# Patient Record
Sex: MALE | Race: WHITE
[De-identification: names, ages, dates, MRNs, and addresses within clinical notes are randomized per-mention and may not be internally consistent; named-entity substitution may affect disease eponyms.]

---

## 2021-11-09 NOTE — EDM.PDOC
ED HPI GENERAL MEDICAL PROBLEM





- General


Chief Complaint: General


Stated Complaint: PASSED OUT


Time Seen by Provider: 11/09/21 12:26


Source of Information: Reports: Patient, Other ()


History Limitations: Reports: No Limitations





- History of Present Illness


INITIAL COMMENTS - FREE TEXT/NARRATIVE: 





Patient presents after a syncopal episode in lab today.  They had just inserted 

the needle in his arm but hadn't drawn much blood yet.  He "passed out" for 

about 30 seconds and exhibited a few dry heaves.  He was sitting back in his 

chair and didn't fall to floor.  No loss of urine.  Patient doesn't remember any

of this.  He tells me he has had body aches, fatigue, weakness and occasional 

vomiting and diarrhea for the last week.  Also felt feverish but never checked 

temp.  The last two days he has been improving but still loose stools 3x/day and

still feeling weak and tired.  He feels like he is getting over the "flu".  He 

was tested yesterday for Covid, influenza a/b, RSV here at the hospital and all 

were negative.





- Related Data


                                    Allergies











Allergy/AdvReac Type Severity Reaction Status Date / Time


 


pollen extracts Allergy  Other Verified 11/09/21 12:00











Home Meds: 


                                    Home Meds





Albuterol Sulfate [Proair Hfa] 2 inh INH Q4H PRN 11/09/21 [History]


Lisinopril/Hydrochlorothiazide [Lisinopril-Hctz 20-12.5 mg Tab] 1 tab PO DAILY 

11/09/21 [History]


amLODIPine [Norvasc] 2.5 mg PO DAILY 11/09/21 [History]











Past Medical History


Cardiovascular History: Reports: Hypertension


Respiratory History: Reports: Asthma





Social & Family History





- Tobacco Use


Tobacco Use Status *Q: Current Every Day Tobacco User


Years of Tobacco use: 39


Packs/Tins Daily: 1


Second Hand Smoke Exposure: Yes





- Caffeine Use


Caffeine Use: Reports: Soda


Caffeine Use Comment: 6 cans per day





- Alcohol Use


Days Per Week of Alcohol Use: 7


Number of Drinks Per Day: 2


Total Drinks Per Week: 14


Date of Last Drink: 11/09/21


Time of Last Drink: 21:00





- Recreational Drug Use


Recreational Drug Use: No





ED ROS GENERAL





- Review of Systems


Review Of Systems: See Below


Constitutional: Reports: Chills, Malaise, Weakness, Fatigue


HEENT: Denies: Ear Pain, Throat Pain, Vision Change


Respiratory: Denies: Shortness of Breath, Cough (no worse than his normal 

"smokers cough")


Cardiovascular: Reports: Syncope.  Denies: Chest Pain, Lightheadedness


Endocrine: Reports: Fatigue


GI/Abdominal: Reports: Diarrhea, Vomiting.  Denies: Abdominal Pain


: Denies: Dysuria, Flank Pain


Musculoskeletal: Reports: No Symptoms


Skin: Reports: No Symptoms


Neurological: Reports: Syncope.  Denies: Confusion, Dizziness, Headache, 

Seizure, Trouble Speaking, Difficulty Walking


Psychiatric: Denies: Agitation, Anxiety, Confusion





ED EXAM, GENERAL





- Physical Exam


Exam: See Below


Exam Limited By: No Limitations


General Appearance: Alert, WD/WN, No Apparent Distress


Eye Exam: Bilateral Eye: EOMI, Normal Inspection, PERRL


Ears: Normal External Exam, Hearing Grossly Normal


Nose: Normal Inspection, No Blood


Throat/Mouth: Normal Inspection, Normal Lips, Normal Voice, No Airway Compromise


Head: Atraumatic, Normocephalic


Neck: Normal Inspection, Full Range of Motion


Respiratory/Chest: No Respiratory Distress, Lungs Clear, Normal Breath Sounds, 

No Accessory Muscle Use


Cardiovascular: Regular Rate, Rhythm, No Murmur


GI/Abdominal: Normal Bowel Sounds, Soft, Non-Tender, No Organomegaly, No 

Distention


Back Exam: Normal Inspection, Full Range of Motion.  No: CVA Tenderness (L), CVA

 Tenderness (R)


Extremities: Normal Inspection, Normal Range of Motion


Neurological: Alert, Oriented, Normal Cognition, No Motor/Sensory Deficits


Psychiatric: Normal Affect, Normal Mood


Skin Exam: Warm, Dry, Intact, Normal Color, No Rash





Course





- Vital Signs


Last Recorded V/S: 


                                Last Vital Signs











Temp  96.5 F L  11/09/21 11:55


 


Pulse  70   11/09/21 13:15


 


Resp  20   11/09/21 13:15


 


BP  167/104 H  11/09/21 13:15


 


Pulse Ox  97   11/09/21 13:15














- Orders/Labs/Meds


Orders: 


                               Active Orders 24 hr











 Category Date Time Status


 


 Sodium Chloride 0.9% @ 999 MLS/HR (1000ml) Med  11/09/21 12:57 Ordered





 Sodium Chloride 0.9% [Normal Saline] 1,000 ml   





 IV .BOLUS   


 


 EKG 12 Lead [EK] Stat Ther  11/09/21 12:42 Ordered








                                Medication Orders





Sodium Chloride (Normal Saline)  1,000 mls @ 999 mls/hr IV .BOLUS ONE


   Stop: 11/09/21 13:57


   Last Admin: 11/09/21 13:10  Dose: 999 mls/hr


   Documented by: TRISTAN








Meds: 


Medications











Generic Name Dose Route Start Last Admin





  Trade Name Paulino  PRN Reason Stop Dose Admin


 


Sodium Chloride  1,000 mls @ 999 mls/hr  11/09/21 12:57  11/09/21 13:10





  Normal Saline  IV  11/09/21 13:57  999 mls/hr





  .BOLUS ONE   Administration














- Re-Assessments/Exams


Free Text/Narrative Re-Assessment/Exam: 





11/09/21 13:33


EKG shows NSR with HR 65


11/09/21 13:52


CBC and CMP are good.  Since they were ordered, along with lipid panel by Dr. Lechuga, they don't populate into this ER note but I discussed them with antoni malloy.  I informed him that his triglycerides are elevated and he will want to 

discuss those results with Dr. Lechuga soon.  


Patient is feeling better and a liter of NS is almost in.  He is discharged to 

home in stable condition.





Departure





- Departure


Time of Disposition: 13:54


Disposition: Home, Self-Care 01


Condition: Good


Clinical Impression: 


Episode of syncope


Qualifiers:


 Syncope type: vasovagal syncope Qualified Code(s): R55 - Syncope and collapse








- Discharge Information


Instructions:  Syncope, Easy-to-Read


Referrals: 


Dacia Aguilar MD [Primary Care Provider] - 


Forms:  ED Department Discharge


Additional Instructions: 


Drink 8 cups of water daily.





Call your PCP for follow up appointment to discuss your labs from today.





If any problems, recheck in clinic or ER as needed.





Sepsis Event Note (ED)





- Evaluation


Sepsis Screening Result: No Definite Risk





- Focused Exam


Vital Signs: 


                                   Vital Signs











  Temp Pulse Resp BP Pulse Ox


 


 11/09/21 13:15   70  20  167/104 H  97


 


 11/09/21 11:55  96.5 F L  75  20  122/83  98














- My Orders


Last 24 Hours: 


My Active Orders





11/09/21 12:42


EKG 12 Lead [EK] Stat 





11/09/21 12:57


Sodium Chloride 0.9% @ 999 MLS/HR (1000ml) Sodium Chloride 0.9% [Normal Saline] 

1,000 ml IV .BOLUS 














- Assessment/Plan


Last 24 Hours: 


My Active Orders





11/09/21 12:42


EKG 12 Lead [EK] Stat 





11/09/21 12:57


Sodium Chloride 0.9% @ 999 MLS/HR (1000ml) Sodium Chloride 0.9% [Normal Saline] 

1,000 ml IV .BOLUS

## 2021-11-12 NOTE — EDM.PDOC
ED HPI GENERAL MEDICAL PROBLEM





- General


Stated Complaint: DISORIENTATION


Time Seen by Provider: 11/12/21 13:04


Source of Information: Reports: Patient, Provider, Significant Other


History Limitations: Reports: No Limitations





- History of Present Illness


INITIAL COMMENTS - FREE TEXT/NARRATIVE: 





Patient presents with several symptoms; some old, some new.  For the past 2 

weeks he has noticed some trouble with balance and blurry vision.  Three days 

ago he passed out from a routine lab draw which seemed odd to him since he 

frequently has small injuries at work and isn't bothered by seeing his own 

blood.  He was in ER for that.  Last evening he had 2 drinks of alcohol and 

around 9-10:00 PM took his Alprazolam for the first time (a new Rx from PCP, 

Liu Hook).  He awoke at 0400 not feeling quite right, a little confused.  

His wife says at 0730, he took a bath and got ready for work then went back to 

bed and slept a bit.  At 0800 he took another bath and didn't remember he did 

before; wife says he was "disoriented like he was drunk".  At 0930 he went to 

work and they sent him home due to confusion.  At 1000 he couldn't remember how 

to use his debit card/PIN at [employer].  He isn't disoriented except 

thought it was 11/11 instead of 11/12.  He knew yesterday was 's Day, 

knows today is Friday.  





- Related Data


                                    Allergies











Allergy/AdvReac Type Severity Reaction Status Date / Time


 


pollen extracts Allergy  Other Verified 11/12/21 12:14











Home Meds: 


                                    Home Meds





Albuterol Sulfate [Proair Hfa] 2 inh INH Q4H PRN 11/09/21 [History]


Lisinopril/Hydrochlorothiazide [Lisinopril-Hctz 20-12.5 mg Tab] 1 tab PO DAILY 

11/09/21 [History]


amLODIPine [Norvasc] 2.5 mg PO DAILY 11/09/21 [History]


ALPRAZolam [Alprazolam] 0.25 mg PO Q8HR PRN 11/12/21 [History]


Fenofibrate 160 mg PO DAILY 11/12/21 [History]


Fluticasone/Salmeterol [Advair 100-50] 1 puff INH DAILY 11/12/21 [History]











Past Medical History


Cardiovascular History: Reports: Hypertension


Respiratory History: Reports: Asthma





Social & Family History





- Caffeine Use


Caffeine Use: Reports: Soda


Caffeine Use Comment: 6 cans per day





ED ROS GENERAL





- Review of Systems


Review Of Systems: See Below


Constitutional: Reports: Chills (feels chilled and shaking in exam room).  

Denies: Fever


HEENT: Denies: Ear Pain, Throat Pain


Respiratory: Denies: Shortness of Breath, Cough


Cardiovascular: Reports: Syncope (three days ago).  Denies: Chest Pain


GI/Abdominal: Reports: Vomiting (not often).  Denies: Abdominal Pain


: Denies: Dysuria, Flank Pain


Musculoskeletal: Denies: Neck Pain, Shoulder Pain, Arm Pain


Skin: Denies: Cyanosis, Jaundice, Mottled, Pallor, Diaphoresis


Neurological: Reports: Confusion.  Denies: Headache


Psychiatric: Denies: Agitation





- Physical Exam


Exam: See Below


Exam Limited By: No Limitations


General Appearance: Alert, WD/WN, No Apparent Distress


Eye Exam: Bilateral Eye: EOMI, Normal Inspection (full visual fields), PERRL


Ears: Normal External Exam, Hearing Grossly Normal


Nose: Normal Inspection, No Blood


Throat/Mouth: Normal Inspection, Normal Lips, Normal Voice, No Airway Compromise


Head Exam: Atraumatic, Normocephalic


Neck: Normal Inspection, Full Range of Motion


Respiratory/Chest: No Respiratory Distress, Lungs Clear, Normal Breath Sounds, 

No Accessory Muscle Use


Cardiovascular: Regular Rate, Rhythm, No Murmur


GI/Abdominal: Normal Bowel Sounds, Soft, Non-Tender, No Organomegaly, No 

Distention


Neuro Exam (Abbreviated): Alert, Oriented, CN II-XII Intact


Back Exam: Normal Inspection, Full Range of Motion.  No: CVA Tenderness (L), CVA

 Tenderness (R)


Extremities: Other (Equal hand , arm strength, dorsiflexion/plantar flexion 

but right leg is shaky and weaker than left for straight leg raise; repeat exam 

is same.  He hasn't noticed a right leg weakness.)


Psychiatric: Normal Affect, Normal Mood


Skin Exam: Warm, Dry, Intact, Normal Color, No Rash





Course





- Vital Signs


Last Recorded V/S: 


                                Last Vital Signs











Temp  97.0 F   11/12/21 12:15


 


Pulse  88   11/12/21 12:45


 


Resp  16   11/12/21 12:45


 


BP  128/85   11/12/21 12:45


 


Pulse Ox  97   11/12/21 12:45














- Orders/Labs/Meds


Orders: 


                               Active Orders 24 hr











 Category Date Time Status


 


 UA W/MICROSCOPIC [URIN] Stat Lab  11/12/21 13:43 Ordered


 


 [place] VIRUS ANTIBODY,SERUM [REF] Stat Lab  11/12/21 13:51 Ordered











Labs: 


                                Laboratory Tests











  11/12/21 11/12/21 11/12/21 Range/Units





  13:37 13:37 14:07 


 


WBC  6.48    (5.00-10.00)  10^3/uL


 


RBC  4.75    (4.50-6.00)  10^6/uL


 


Hgb  15.3    (13.0-17.0)  g/dL


 


Hct  44.1    (40.0-52.0)  %


 


MCV  92.8 H    (82.0-92.0)  fL


 


MCH  32.2 H    (27.0-31.0)  pg


 


MCHC  34.7    (32.0-36.0)  g/dL


 


RDW  13.0    (11.5-14.5)  %


 


Plt Count  224    (150-400)  10^3/uL


 


MPV  8.5    (7.4-10.4)  fL


 


Immature Gran % (Auto)  0.2    (0.0-5.0)  %


 


Neut % (Auto)  79.8 H    (50.0-70.0)  %


 


Lymph % (Auto)  15.3 L    (20.0-40.0)  %


 


Mono % (Auto)  4.3    (2.0-8.0)  %


 


Eos % (Auto)  0.2 L    (1.0-3.0)  %


 


Baso % (Auto)  0.2    (0.0-1.0)  %


 


Neut # (Auto)  5.18    (2.50-7.00)  10^3/uL


 


Lymph # (Auto)  0.99 L    (1.00-4.00)  10^3/uL


 


Mono # (Auto)  0.28    (0.10-0.80)  10^3/uL


 


Eos # (Auto)  0.01 L    (0.10-0.30)  10^3/uL


 


Baso # (Auto)  0.01    (0.00-0.10)  10^3/uL


 


Immature Gran # (Auto)  0.01    (0.00-0.50)  10^3/uL


 


Sodium   140   (136-145)  mmol/L


 


Potassium   4.1   (3.5-5.1)  mmol/L


 


Chloride   102   ()  mmol/L


 


Carbon Dioxide   23.8   (21.0-32.0)  mmol/L


 


Anion Gap   18.3 H   (5-15)  mmol/L


 


BUN   14   (7-18)  mg/dL


 


Creatinine   0.71   (0.51-1.17)  mg/dL


 


Est Cr Clr Drug Dosing   123.73   mL/min


 


Estimated GFR (MDRD)   > 60   mL/min


 


Glucose   107   ()  mg/dL


 


Calcium   8.8   (8.7-10.3)  mg/dL


 


Total Bilirubin   0.6   (0.2-1.0)  mg/dL


 


AST   36   (15-37)  U/L


 


ALT   42   (14-63)  U/L


 


Alkaline Phosphatase   50   ()  U/L


 


Total Protein   7.8   (6.4-8.2)  g/dL


 


Albumin   3.85   (3.40-5.00)  g/dL


 


Ethyl Alcohol   221 H*   (NOT DETECTED)  mg/dL


 


SARS CoV-2 RNA Rapid NORBERTO    Negative  (NEGATIVE)  














- Re-Assessments/Exams


Free Text/Narrative Re-Assessment/Exam: 





11/12/21 14:04


Head CT and CBC are normal.  Waiting on more labs.


11/12/21 15:02


CBC, CMP, Covid are normal.  ETOH is 221.  Since Liu Hook NP is his PCP 

and talked to him this morning before he came to ER, I discussed case with him. 

 At around 0900 he had told patient to not go to work since he wasn't feeling 

quite right and didn't want him driving.  Liu and I talked with the patient 

about the ETOH and he first said he had one drink this morning; then said he had

 2 drinks this morning; it sounds a little fuzzy about how much the drinks 

(cassi) actually were.  


While we waited for the last labs patient left without final discussion of 

results and plan.  The nurse called him back with results and talked with his 

wife; he had made it home.  Based on findings, we feel there is no stroke 

symptoms and it is all likely due to alcohol use.  He will follow up with Liu 

in clinic for help with this.





Departure





- Departure


Time of Disposition: 15:11


Disposition: Home, Self-Care 01


Condition: Good


Clinical Impression: 


 Alcohol abuse








- Discharge Information


Referrals: 


Liu Hook NP [Primary Care Provider] - 





Sepsis Event Note (ED)





- Focused Exam


Vital Signs: 


                                   Vital Signs











  Temp Pulse Resp BP Pulse Ox


 


 11/12/21 12:45   88  16  128/85  97


 


 11/12/21 12:30   94  16  133/96 H  98


 


 11/12/21 12:15  97.0 F  105 H  20  145/90 H  97














- My Orders


Last 24 Hours: 


My Active Orders





11/12/21 13:43


UA W/MICROSCOPIC [URIN] Stat 





11/12/21 13:51


[place] VIRUS ANTIBODY,SERUM [REF] Stat 














- Assessment/Plan


Last 24 Hours: 


My Active Orders





11/12/21 13:43


UA W/MICROSCOPIC [URIN] Stat 





11/12/21 13:51


[place] VIRUS ANTIBODY,SERUM [REF] Stat

## 2021-11-12 NOTE — CT
______________________________________________________________________________   

  

[id number] CT/CT Head WO IV  

EXAM:   

   

 CT Head WO IV  

   

 CLINICAL DATA:   

   

 NEUROLOGIC DEFICIT  

   

 COMPARISON:   

   

 No previous similar exam is available for comparison.  

   

 FINDINGS:   

   

 There is no mass or mass effect.  

   

 There is no hemorrhage or hydrocephalus.  

   

 There are no extra-axial fluid collections.  

   

 There are no sites of abnormal attenuation.  

   

 IMPRESSION:  

   

 NO PLAIN CT EVIDENCE OF ACUTE INTRACRANIAL PROCESS.  

   

 Electronically signed by Oleg Adkins MD on 11/12/2021 1:41 PM  

   

  

Oleg Adkins MD                 

 11/12/21 [id number]    

  

Thank you for allowing us to participate in the care of your patient.

## 2023-02-13 ENCOUNTER — HOSPITAL ENCOUNTER (EMERGENCY)
Dept: HOSPITAL 77 - KA.ED | Age: 58
Discharge: HOME | End: 2023-02-13
Payer: MEDICAID

## 2023-02-13 DIAGNOSIS — R42: Primary | ICD-10-CM

## 2023-02-13 DIAGNOSIS — J45.909: ICD-10-CM

## 2023-02-13 DIAGNOSIS — I10: ICD-10-CM

## 2023-02-13 DIAGNOSIS — Z79.899: ICD-10-CM
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
## 2023-02-13 DIAGNOSIS — Z91.048: ICD-10-CM

## 2023-02-13 DIAGNOSIS — F10.920: ICD-10-CM

## 2023-02-13 LAB
ANION GAP SERPL CALC-SCNC: 18.4 MMOL/L (ref 5–15)
CHLORIDE SERPL-SCNC: 100 MMOL/L (ref 98–107)
EGFRCR SERPLBLD CKD-EPI 2021: 99 ML/MIN (ref 60–?)
SODIUM SERPL-SCNC: 139 MMOL/L (ref 136–145)

## 2024-08-15 ENCOUNTER — HOSPITAL ENCOUNTER (EMERGENCY)
Dept: HOSPITAL 77 - KA.ED | Age: 59
Discharge: HOME | End: 2024-08-15
Payer: MEDICAID

## 2024-08-15 DIAGNOSIS — Z91.048: ICD-10-CM

## 2024-08-15 DIAGNOSIS — F17.200: ICD-10-CM

## 2024-08-15 DIAGNOSIS — E78.00: ICD-10-CM

## 2024-08-15 DIAGNOSIS — Z79.51: ICD-10-CM

## 2024-08-15 DIAGNOSIS — Z79.899: ICD-10-CM

## 2024-08-15 DIAGNOSIS — Z91.018: ICD-10-CM

## 2024-08-15 DIAGNOSIS — I10: ICD-10-CM

## 2024-08-15 DIAGNOSIS — J45.41: ICD-10-CM

## 2024-08-15 DIAGNOSIS — J44.0: Primary | ICD-10-CM

## 2024-08-15 LAB
ANION GAP SERPL CALC-SCNC: 20.3 MMOL/L (ref 5–15)
BASOPHILS # BLD AUTO: 0 10^3/UL (ref 0–0.1)
BASOPHILS NFR BLD AUTO: 0 % (ref 0–1)
BUN SERPL-MCNC: 16 MG/DL (ref 7–18)
CALCIUM SERPL-MCNC: 8.6 MG/DL (ref 8.7–10.3)
CHLORIDE SERPL-SCNC: 103 MMOL/L (ref 98–107)
CO2 SERPL-SCNC: 21.2 MMOL/L (ref 21–32)
CREAT CL 24H UR+SERPL-VRATE: (no result) ML/MIN
CREAT SERPL-MCNC: 0.86 MG/DL (ref 0.51–1.17)
EGFRCR SERPLBLD CKD-EPI 2021: 100 ML/MIN (ref 60–?)
EOSINOPHIL # BLD AUTO: 0 10^3/UL (ref 0.1–0.3)
EOSINOPHIL NFR BLD AUTO: 0 % (ref 1–3)
ERYTHROCYTE [DISTWIDTH] IN BLOOD BY AUTOMATED COUNT: 13.9 % (ref 11.5–14.5)
GLUCOSE SERPL-MCNC: 119 MG/DL (ref 70–140)
HCT VFR BLD AUTO: 38 % (ref 40–52)
HGB BLD-MCNC: 13.3 G/DL (ref 13–17)
IMM GRANULOCYTES # BLD: 0.01 10^3/UL (ref 0–0.5)
IMM GRANULOCYTES NFR BLD: 0.2 % (ref 0–5)
LYMPHOCYTES # BLD AUTO: 0.28 10^3/UL (ref 1–4)
LYMPHOCYTES NFR BLD AUTO: 4.6 % (ref 20–40)
MCH RBC QN AUTO: 32.6 PG (ref 27–31)
MCHC RBC AUTO-ENTMCNC: 35 G/DL (ref 32–36)
MCHC RBC AUTO-ENTMCNC: 93.1 FL (ref 82–92)
MONOCYTES # BLD AUTO: 0.23 10^3/UL (ref 0.1–0.8)
MONOCYTES NFR BLD AUTO: 3.8 % (ref 2–8)
NEUTROPHILS # BLD AUTO: 5.52 10^3/UL (ref 2.5–7)
NEUTROPHILS NFR BLD AUTO: 91.4 % (ref 50–70)
PLATELET # BLD AUTO: 160 10^3/UL (ref 150–400)
PMV BLD AUTO: 8.6 FL (ref 7.4–10.4)
POTASSIUM SERPL-SCNC: 3.5 MMOL/L (ref 3.5–5.1)
RBC # BLD AUTO: 4.08 10^6/UL (ref 4.5–6)
SODIUM SERPL-SCNC: 141 MMOL/L (ref 136–145)
WBC # BLD AUTO: 6.04 10^3/UL (ref 5–10)

## 2024-08-15 RX ADMIN — ALBUTEROL SULFATE ONE MG: 2.5 SOLUTION RESPIRATORY (INHALATION) at 14:08

## 2024-08-19 ENCOUNTER — HOSPITAL ENCOUNTER (EMERGENCY)
Dept: HOSPITAL 50 - VM.ED | Age: 59
Discharge: HOME | End: 2024-08-19
Payer: COMMERCIAL

## 2024-08-19 DIAGNOSIS — R42: Primary | ICD-10-CM

## 2024-08-19 DIAGNOSIS — Z79.52: ICD-10-CM

## 2024-08-19 DIAGNOSIS — Z91.048: ICD-10-CM

## 2024-08-19 DIAGNOSIS — Z79.899: ICD-10-CM

## 2024-08-19 DIAGNOSIS — Z79.51: ICD-10-CM

## 2024-08-19 DIAGNOSIS — T38.0X5A: ICD-10-CM

## 2024-08-19 DIAGNOSIS — I10: ICD-10-CM

## 2024-08-19 LAB
ALBUMIN SERPL-MCNC: 4.2 G/DL (ref 3.4–5)
ALBUMIN/GLOB SERPL: 1.24 {RATIO}
ALP SERPL-CCNC: 50 U/L (ref 46–116)
ALT SERPL-CCNC: 35 U/L (ref 16–63)
ANION GAP SERPL CALC-SCNC: 16.5 MMOL/L (ref 5–15)
AST SERPL-CCNC: 47 U/L (ref 15–37)
BASOPHILS # BLD AUTO: 0 X10^3/UL (ref 0–0.2)
BASOPHILS NFR BLD AUTO: 1.1 % (ref 0.2–1.2)
BILIRUB SERPL-MCNC: 0.5 MG/DL (ref 0.2–1)
BUN SERPL-MCNC: 13 MG/DL (ref 7–18)
CALCIUM SERPL-MCNC: 9.4 MG/DL (ref 8.5–10.1)
CHLORIDE SERPL-SCNC: 97 MMOL/L (ref 98–107)
CO2 SERPL-SCNC: 25 MMOL/L (ref 21–32)
CREAT CL 24H UR+SERPL-VRATE: (no result) ML/MIN
CREAT SERPL-MCNC: 0.7 MG/DL (ref 0.7–1.3)
EGFRCR SERPLBLD CKD-EPI 2021: 106 ML/MIN (ref 60–?)
EOSINOPHIL # BLD AUTO: 0.1 X10^3/UL (ref 0–0.5)
EOSINOPHIL NFR BLD AUTO: 1.6 % (ref 0–4)
GLOBULIN SER-MCNC: 3.4 G/DL
GLUCOSE SERPL-MCNC: 107 MG/DL (ref 70–99)
HCT VFR BLD AUTO: 37 % (ref 40–52)
HGB BLD-MCNC: 13.3 G/DL (ref 14–18)
IMM GRANULOCYTES # BLD: 0.01 X10^3/UL (ref 0–0.07)
IMM GRANULOCYTES NFR BLD: 0.3 % (ref 0–0.43)
LYMPHOCYTES # BLD AUTO: 0.5 X10^3/UL (ref 1–4.8)
LYMPHOCYTES NFR BLD AUTO: 14 % (ref 25–50)
MCH RBC QN AUTO: 33.7 PG (ref 26–32)
MCHC RBC AUTO-ENTMCNC: 35.9 G/DL (ref 32–36)
MCHC RBC AUTO-ENTMCNC: 93.7 FL (ref 78–93)
MONOCYTES # BLD AUTO: 0.5 X10^3/UL (ref 0–0.8)
MONOCYTES NFR BLD AUTO: 11.9 % (ref 2–11)
NEUTROPHILS # BLD AUTO: 2.7 X10^3/UL (ref 1.8–7.7)
NEUTROPHILS NFR BLD AUTO: 71.1 % (ref 50–80)
PLATELET # BLD AUTO: 146 X10^3/UL (ref 130–400)
POTASSIUM SERPL-SCNC: 3.5 MMOL/L (ref 3.5–5.1)
PROT SERPL-MCNC: 7.6 G/DL (ref 6.4–8.2)
RBC # BLD AUTO: 3.95 X10^6/UL (ref 4.5–6)
SODIUM SERPL-SCNC: 135 MMOL/L (ref 136–145)
WBC # BLD AUTO: 3.8 X10^3/UL (ref 4–10)

## 2024-09-21 ENCOUNTER — HOSPITAL ENCOUNTER (EMERGENCY)
Dept: HOSPITAL 77 - KA.ED | Age: 59
Discharge: HOME | End: 2024-09-21
Payer: MEDICAID

## 2024-09-21 DIAGNOSIS — F10.220: Primary | ICD-10-CM

## 2024-09-21 DIAGNOSIS — Z79.899: ICD-10-CM

## 2024-09-21 DIAGNOSIS — I10: ICD-10-CM

## 2024-09-21 DIAGNOSIS — Z91.048: ICD-10-CM

## 2024-09-21 DIAGNOSIS — F17.210: ICD-10-CM

## 2024-09-21 DIAGNOSIS — E87.6: ICD-10-CM

## 2024-09-21 LAB
AMPHET UR QL SCN: NEGATIVE
AMPHET UR QL SCN: NEGATIVE
ANION GAP SERPL CALC-SCNC: 16.6 MMOL/L (ref 5–15)
APPEARANCE UR: CLEAR
BACTERIA URNS QL MICRO: (no result) /HPF
BARBITURATES UR QL SCN: NEGATIVE
BASOPHILS # BLD AUTO: 0.04 10^3/UL (ref 0–0.1)
BASOPHILS NFR BLD AUTO: 1.2 % (ref 0–1)
BENZODIAZ UR QL SCN: NEGATIVE
BILIRUB UR STRIP-MCNC: NEGATIVE MG/DL
BUN SERPL-MCNC: 8 MG/DL (ref 7–18)
CALCIUM SERPL-MCNC: 8.7 MG/DL (ref 8.7–10.3)
CHLORIDE SERPL-SCNC: 97 MMOL/L (ref 98–107)
CO2 SERPL-SCNC: 27.4 MMOL/L (ref 21–32)
COCAINE UR QL SCN: NEGATIVE
COLOR UR: YELLOW
CREAT CL 24H UR+SERPL-VRATE: 132.36 ML/MIN
CREAT SERPL-MCNC: 0.64 MG/DL (ref 0.51–1.17)
EGFRCR SERPLBLD CKD-EPI 2021: 109 ML/MIN (ref 60–?)
EOSINOPHIL # BLD AUTO: 0.07 10^3/UL (ref 0.1–0.3)
EOSINOPHIL NFR BLD AUTO: 2 % (ref 1–3)
EPI CELLS #/AREA URNS HPF: (no result) /LPF
ERYTHROCYTE [DISTWIDTH] IN BLOOD BY AUTOMATED COUNT: 15.1 % (ref 11.5–14.5)
ETHANOL BLD-MCNC: 305 MG/DL
GLUCOSE SERPL-MCNC: 95 MG/DL (ref 70–140)
GLUCOSE UR STRIP-MCNC: NEGATIVE MG/DL
HCT VFR BLD AUTO: 37.2 % (ref 40–52)
HGB BLD-MCNC: 13.1 G/DL (ref 13–17)
IMM GRANULOCYTES # BLD: 0 10^3/UL (ref 0–0.5)
IMM GRANULOCYTES NFR BLD: 0 % (ref 0–5)
KETONES UR STRIP-MCNC: NEGATIVE MG/DL
LYMPHOCYTES # BLD AUTO: 0.62 10^3/UL (ref 1–4)
LYMPHOCYTES NFR BLD AUTO: 18.1 % (ref 20–40)
MCH RBC QN AUTO: 33.8 PG (ref 27–31)
MCHC RBC AUTO-ENTMCNC: 35.2 G/DL (ref 32–36)
MCHC RBC AUTO-ENTMCNC: 95.9 FL (ref 82–92)
METHADONE UR QL SCN: NEGATIVE
MONOCYTES # BLD AUTO: 0.4 10^3/UL (ref 0.1–0.8)
MONOCYTES NFR BLD AUTO: 11.7 % (ref 2–8)
NEUTROPHILS # BLD AUTO: 2.3 10^3/UL (ref 2.5–7)
NEUTROPHILS NFR BLD AUTO: 67 % (ref 50–70)
NITRITE UR QL: NEGATIVE
OXYCODONE UR QL SCN: NEGATIVE
PCP UR QL SCN: NEGATIVE
PH UR STRIP: 7 [PH] (ref 5–9)
PLATELET # BLD AUTO: 106 10^3/UL (ref 150–400)
PMV BLD AUTO: 9.2 FL (ref 7.4–10.4)
POTASSIUM SERPL-SCNC: 3 MMOL/L (ref 3.5–5.1)
PROT UR STRIP-MCNC: NEGATIVE MG/DL
RBC # BLD AUTO: 3.88 10^6/UL (ref 4.5–6)
RBC # URNS HPF: (no result) /HPF (ref 0–5)
RBC UR QL: NEGATIVE
SODIUM SERPL-SCNC: 138 MMOL/L (ref 136–145)
SP GR UR STRIP: 1.01 (ref 1–1.03)
TCA UR-MCNC: NEGATIVE UG/ML
THC UR QL SCN>50 NG/ML: NEGATIVE
UROBILINOGEN UR STRIP-ACNC: 0.2 E.U./DL (ref 0.2–1)
WBC # BLD AUTO: 3.43 10^3/UL (ref 5–10)
WBC UR QL: (no result) /HPF (ref 0–5)

## 2024-09-21 PROCEDURE — 81001 URINALYSIS AUTO W/SCOPE: CPT

## 2024-09-21 PROCEDURE — 99285 EMERGENCY DEPT VISIT HI MDM: CPT

## 2024-09-21 PROCEDURE — 85025 COMPLETE CBC W/AUTO DIFF WBC: CPT

## 2024-09-21 PROCEDURE — 96360 HYDRATION IV INFUSION INIT: CPT

## 2024-09-21 PROCEDURE — 84484 ASSAY OF TROPONIN QUANT: CPT

## 2024-09-21 PROCEDURE — 80048 BASIC METABOLIC PNL TOTAL CA: CPT

## 2024-09-21 PROCEDURE — 80307 DRUG TEST PRSMV CHEM ANLYZR: CPT

## 2024-09-21 PROCEDURE — 96361 HYDRATE IV INFUSION ADD-ON: CPT

## 2024-09-21 PROCEDURE — 80305 DRUG TEST PRSMV DIR OPT OBS: CPT

## 2024-09-21 RX ADMIN — Medication ONE EACH: at 17:35

## 2024-09-21 RX ADMIN — POTASSIUM CHLORIDE ONE MEQ: 1500 TABLET, EXTENDED RELEASE ORAL at 17:35

## 2024-09-22 ENCOUNTER — HOSPITAL ENCOUNTER (EMERGENCY)
Dept: HOSPITAL 77 - KA.ED | Age: 59
Discharge: HOME | End: 2024-09-22
Payer: MEDICAID

## 2024-09-22 DIAGNOSIS — E87.6: Primary | ICD-10-CM

## 2024-09-22 DIAGNOSIS — I10: ICD-10-CM

## 2024-09-22 DIAGNOSIS — Z79.899: ICD-10-CM

## 2024-09-22 DIAGNOSIS — F17.210: ICD-10-CM

## 2024-09-22 DIAGNOSIS — Z91.048: ICD-10-CM

## 2024-09-22 DIAGNOSIS — F10.120: Primary | ICD-10-CM

## 2024-09-22 DIAGNOSIS — Z88.0: ICD-10-CM

## 2024-09-22 DIAGNOSIS — F32.A: ICD-10-CM

## 2024-09-22 DIAGNOSIS — J45.909: ICD-10-CM

## 2024-09-22 DIAGNOSIS — F10.220: ICD-10-CM

## 2024-09-22 LAB
ANION GAP SERPL CALC-SCNC: 14.9 MMOL/L (ref 5–15)
BASOPHILS # BLD AUTO: 0.04 10^3/UL (ref 0–0.1)
BASOPHILS NFR BLD AUTO: 1.1 % (ref 0–1)
BUN SERPL-MCNC: 6 MG/DL (ref 7–18)
CALCIUM SERPL-MCNC: 8.3 MG/DL (ref 8.7–10.3)
CHLORIDE SERPL-SCNC: 101 MMOL/L (ref 98–107)
CO2 SERPL-SCNC: 26.2 MMOL/L (ref 21–32)
CREAT CL 24H UR+SERPL-VRATE: (no result) ML/MIN
CREAT SERPL-MCNC: 0.51 MG/DL (ref 0.51–1.17)
EGFRCR SERPLBLD CKD-EPI 2021: 117 ML/MIN (ref 60–?)
EOSINOPHIL # BLD AUTO: 0.07 10^3/UL (ref 0.1–0.3)
EOSINOPHIL NFR BLD AUTO: 1.8 % (ref 1–3)
ERYTHROCYTE [DISTWIDTH] IN BLOOD BY AUTOMATED COUNT: 15.1 % (ref 11.5–14.5)
ETHANOL BLD-MCNC: 415 MG/DL
GLUCOSE SERPL-MCNC: 97 MG/DL (ref 70–140)
HCT VFR BLD AUTO: 38.8 % (ref 40–52)
HGB BLD-MCNC: 13.4 G/DL (ref 13–17)
IMM GRANULOCYTES # BLD: 0.01 10^3/UL (ref 0–0.5)
IMM GRANULOCYTES NFR BLD: 0.3 % (ref 0–5)
LYMPHOCYTES # BLD AUTO: 0.55 10^3/UL (ref 1–4)
LYMPHOCYTES NFR BLD AUTO: 14.5 % (ref 20–40)
MCH RBC QN AUTO: 33.4 PG (ref 27–31)
MCHC RBC AUTO-ENTMCNC: 34.5 G/DL (ref 32–36)
MCHC RBC AUTO-ENTMCNC: 96.8 FL (ref 82–92)
MONOCYTES # BLD AUTO: 0.44 10^3/UL (ref 0.1–0.8)
MONOCYTES NFR BLD AUTO: 11.6 % (ref 2–8)
NEUTROPHILS # BLD AUTO: 2.68 10^3/UL (ref 2.5–7)
NEUTROPHILS NFR BLD AUTO: 70.7 % (ref 50–70)
PLATELET # BLD AUTO: 119 10^3/UL (ref 150–400)
PMV BLD AUTO: 9.2 FL (ref 7.4–10.4)
POTASSIUM SERPL-SCNC: 3.1 MMOL/L (ref 3.5–5.1)
RBC # BLD AUTO: 4.01 10^6/UL (ref 4.5–6)
SODIUM SERPL-SCNC: 139 MMOL/L (ref 136–145)
WBC # BLD AUTO: 3.79 10^3/UL (ref 5–10)

## 2024-09-22 PROCEDURE — 85025 COMPLETE CBC W/AUTO DIFF WBC: CPT

## 2024-09-22 PROCEDURE — 96360 HYDRATION IV INFUSION INIT: CPT

## 2024-09-22 PROCEDURE — 99285 EMERGENCY DEPT VISIT HI MDM: CPT

## 2024-09-22 PROCEDURE — 80048 BASIC METABOLIC PNL TOTAL CA: CPT

## 2024-09-22 PROCEDURE — 80307 DRUG TEST PRSMV CHEM ANLYZR: CPT

## 2024-09-22 PROCEDURE — 96361 HYDRATE IV INFUSION ADD-ON: CPT

## 2024-09-22 PROCEDURE — 36415 COLL VENOUS BLD VENIPUNCTURE: CPT

## 2024-09-22 RX ADMIN — POTASSIUM CHLORIDE ONE MEQ: 1500 TABLET, EXTENDED RELEASE ORAL at 14:13

## 2024-11-01 ENCOUNTER — HOSPITAL ENCOUNTER (EMERGENCY)
Dept: HOSPITAL 77 - KA.ED | Age: 59
Discharge: HOME | End: 2024-11-01
Payer: MEDICAID

## 2024-11-01 VITALS — SYSTOLIC BLOOD PRESSURE: 124 MMHG | DIASTOLIC BLOOD PRESSURE: 93 MMHG | HEART RATE: 87 BPM

## 2024-11-01 DIAGNOSIS — I10: ICD-10-CM

## 2024-11-01 DIAGNOSIS — Z91.048: ICD-10-CM

## 2024-11-01 DIAGNOSIS — Z79.51: ICD-10-CM

## 2024-11-01 DIAGNOSIS — R07.9: Primary | ICD-10-CM

## 2024-11-01 DIAGNOSIS — Z79.1: ICD-10-CM

## 2024-11-01 DIAGNOSIS — F10.920: ICD-10-CM

## 2024-11-01 DIAGNOSIS — Z79.899: ICD-10-CM

## 2024-11-01 LAB
ALBUMIN SERPL-MCNC: 3.75 G/DL (ref 3.4–5)
ALP SERPL-CCNC: 80 U/L (ref 46–116)
ALT SERPL-CCNC: 27 U/L (ref 14–63)
ANION GAP SERPL CALC-SCNC: 19.7 MMOL/L (ref 5–15)
AST SERPL-CCNC: 28 U/L (ref 15–37)
BASOPHILS # BLD AUTO: 0.05 10^3/UL (ref 0–0.1)
BASOPHILS NFR BLD AUTO: 0.9 % (ref 0–1)
BILIRUB SERPL-MCNC: 0.4 MG/DL (ref 0.2–1)
BUN SERPL-MCNC: 6 MG/DL (ref 7–18)
CALCIUM SERPL-MCNC: 8.5 MG/DL (ref 8.7–10.3)
CHLORIDE SERPL-SCNC: 100 MMOL/L (ref 98–107)
CO2 SERPL-SCNC: 22.3 MMOL/L (ref 21–32)
CREAT CL 24H UR+SERPL-VRATE: 123.46 ML/MIN
CREAT SERPL-MCNC: 0.67 MG/DL (ref 0.51–1.17)
EGFRCR SERPLBLD CKD-EPI 2021: 108 ML/MIN (ref 60–?)
EOSINOPHIL # BLD AUTO: 0.13 10^3/UL (ref 0.1–0.3)
EOSINOPHIL NFR BLD AUTO: 2.5 % (ref 1–3)
ERYTHROCYTE [DISTWIDTH] IN BLOOD BY AUTOMATED COUNT: 12.9 % (ref 11.5–14.5)
ETHANOL BLD-MCNC: 336 MG/DL (ref ?–3)
GLUCOSE SERPL-MCNC: 108 MG/DL (ref 70–140)
HCT VFR BLD AUTO: 41.6 % (ref 40–52)
HGB BLD-MCNC: 14.7 G/DL (ref 13–17)
IMM GRANULOCYTES # BLD: 0 10^3/UL (ref 0–0.5)
IMM GRANULOCYTES NFR BLD: 0 % (ref 0–5)
LYMPHOCYTES # BLD AUTO: 1.38 10^3/UL (ref 1–4)
LYMPHOCYTES NFR BLD AUTO: 26.1 % (ref 20–40)
MCH RBC QN AUTO: 32.7 PG (ref 27–31)
MCHC RBC AUTO-ENTMCNC: 35.3 G/DL (ref 32–36)
MCHC RBC AUTO-ENTMCNC: 92.7 FL (ref 82–92)
MONOCYTES # BLD AUTO: 0.43 10^3/UL (ref 0.1–0.8)
MONOCYTES NFR BLD AUTO: 8.1 % (ref 2–8)
NEUTROPHILS # BLD AUTO: 3.3 10^3/UL (ref 2.5–7)
NEUTROPHILS NFR BLD AUTO: 62.4 % (ref 50–70)
PLATELET # BLD AUTO: 171 10^3/UL (ref 150–400)
PMV BLD AUTO: 8.8 FL (ref 7.4–10.4)
POTASSIUM SERPL-SCNC: 3 MMOL/L (ref 3.5–5.1)
PROT SERPL-MCNC: 7.3 G/DL (ref 6.4–8.2)
RBC # BLD AUTO: 4.49 10^6/UL (ref 4.5–6)
SODIUM SERPL-SCNC: 139 MMOL/L (ref 136–145)
WBC # BLD AUTO: 5.29 10^3/UL (ref 5–10)

## 2024-12-01 ENCOUNTER — HOSPITAL ENCOUNTER (EMERGENCY)
Dept: HOSPITAL 77 - KA.ED | Age: 59
Discharge: NURSING, REHAB OR LONG-TERM CARE | End: 2024-12-01
Payer: MEDICAID

## 2024-12-01 DIAGNOSIS — E78.00: ICD-10-CM

## 2024-12-01 DIAGNOSIS — I10: ICD-10-CM

## 2024-12-01 DIAGNOSIS — J45.909: ICD-10-CM

## 2024-12-01 DIAGNOSIS — F10.920: ICD-10-CM

## 2024-12-01 DIAGNOSIS — Z79.899: ICD-10-CM

## 2024-12-01 DIAGNOSIS — T46.1X2A: Primary | ICD-10-CM

## 2024-12-01 DIAGNOSIS — Z91.048: ICD-10-CM

## 2024-12-01 LAB
ALBUMIN SERPL-MCNC: 3.51 G/DL (ref 3.4–5)
ALP SERPL-CCNC: 68 U/L (ref 46–116)
ALT SERPL-CCNC: 20 U/L (ref 14–63)
AMPHET UR QL SCN: NEGATIVE
AMPHET UR QL SCN: NEGATIVE
ANION GAP SERPL CALC-SCNC: 15.7 MMOL/L (ref 5–15)
AST SERPL-CCNC: 43 U/L (ref 15–37)
BARBITURATES UR QL SCN: NEGATIVE
BASOPHILS # BLD AUTO: 0.05 10^3/UL (ref 0–0.1)
BASOPHILS NFR BLD AUTO: 0.8 % (ref 0–1)
BENZODIAZ UR QL SCN: NEGATIVE
BILIRUB SERPL-MCNC: 0.6 MG/DL (ref 0.2–1)
BUN SERPL-MCNC: 9 MG/DL (ref 7–18)
CALCIUM SERPL-MCNC: 7.5 MG/DL (ref 8.7–10.3)
CHLORIDE SERPL-SCNC: 101 MMOL/L (ref 98–107)
CO2 SERPL-SCNC: 25.7 MMOL/L (ref 21–32)
COCAINE UR QL SCN: NEGATIVE
CREAT CL 24H UR+SERPL-VRATE: (no result) ML/MIN
CREAT SERPL-MCNC: 0.6 MG/DL (ref 0.51–1.17)
EGFRCR SERPLBLD CKD-EPI 2021: 111 ML/MIN (ref 60–?)
EOSINOPHIL # BLD AUTO: 0.1 10^3/UL (ref 0.1–0.3)
EOSINOPHIL NFR BLD AUTO: 1.6 % (ref 1–3)
ERYTHROCYTE [DISTWIDTH] IN BLOOD BY AUTOMATED COUNT: 13 % (ref 11.5–14.5)
ETHANOL BLD-MCNC: 397 MG/DL (ref ?–3)
GLUCOSE SERPL-MCNC: 93 MG/DL (ref 70–140)
HCT VFR BLD AUTO: 41.7 % (ref 40–52)
HGB BLD-MCNC: 14.8 G/DL (ref 13–17)
IMM GRANULOCYTES # BLD: 0 10^3/UL (ref 0–0.5)
IMM GRANULOCYTES NFR BLD: 0 % (ref 0–5)
LYMPHOCYTES # BLD AUTO: 1.75 10^3/UL (ref 1–4)
LYMPHOCYTES NFR BLD AUTO: 27.8 % (ref 20–40)
MAGNESIUM SERPL-MCNC: 1.7 MG/DL (ref 1.8–2.4)
MCH RBC QN AUTO: 32 PG (ref 27–31)
MCHC RBC AUTO-ENTMCNC: 35.5 G/DL (ref 32–36)
MCHC RBC AUTO-ENTMCNC: 90.3 FL (ref 82–92)
METHADONE UR QL SCN: NEGATIVE
MONOCYTES # BLD AUTO: 0.41 10^3/UL (ref 0.1–0.8)
MONOCYTES NFR BLD AUTO: 6.5 % (ref 2–8)
NEUTROPHILS # BLD AUTO: 3.98 10^3/UL (ref 2.5–7)
NEUTROPHILS NFR BLD AUTO: 63.3 % (ref 50–70)
OXYCODONE UR QL SCN: NEGATIVE
PCP UR QL SCN: NEGATIVE
PLATELET # BLD AUTO: 147 10^3/UL (ref 150–400)
PMV BLD AUTO: 8.9 FL (ref 7.4–10.4)
POTASSIUM SERPL-SCNC: 3.4 MMOL/L (ref 3.5–5.1)
PROT SERPL-MCNC: 6.9 G/DL (ref 6.4–8.2)
RBC # BLD AUTO: 4.62 10^6/UL (ref 4.5–6)
SODIUM SERPL-SCNC: 139 MMOL/L (ref 136–145)
TCA UR-MCNC: NEGATIVE UG/ML
THC UR QL SCN>50 NG/ML: NEGATIVE
WBC # BLD AUTO: 6.29 10^3/UL (ref 5–10)

## 2024-12-01 RX ADMIN — METHYLPREDNISOLONE SODIUM SUCCINATE ONE MG: 125 INJECTION, POWDER, FOR SOLUTION INTRAMUSCULAR; INTRAVENOUS at 22:41

## 2024-12-01 RX ADMIN — LORAZEPAM ONE MG: 2 INJECTION INTRAMUSCULAR; INTRAVENOUS at 23:02

## 2024-12-01 RX ADMIN — LORAZEPAM ONE: 2 INJECTION INTRAMUSCULAR; INTRAVENOUS at 23:02

## 2024-12-01 RX ADMIN — LORAZEPAM ONE MG: 2 INJECTION INTRAMUSCULAR; INTRAVENOUS at 23:05

## 2024-12-02 VITALS — SYSTOLIC BLOOD PRESSURE: 156 MMHG | DIASTOLIC BLOOD PRESSURE: 96 MMHG | HEART RATE: 80 BPM

## 2024-12-20 ENCOUNTER — HOSPITAL ENCOUNTER (INPATIENT)
Dept: HOSPITAL 77 - KA.ED | Age: 59
Discharge: LEFT BEFORE DISCHARGE | DRG: 894 | End: 2024-12-20
Attending: HOSPITALIST | Admitting: PHYSICIAN ASSISTANT
Payer: MEDICAID

## 2024-12-20 VITALS — HEART RATE: 102 BPM

## 2024-12-20 VITALS — DIASTOLIC BLOOD PRESSURE: 85 MMHG | SYSTOLIC BLOOD PRESSURE: 138 MMHG

## 2024-12-20 DIAGNOSIS — G40.909: ICD-10-CM

## 2024-12-20 DIAGNOSIS — I10: ICD-10-CM

## 2024-12-20 DIAGNOSIS — J44.89: ICD-10-CM

## 2024-12-20 DIAGNOSIS — F10.129: ICD-10-CM

## 2024-12-20 DIAGNOSIS — Z72.0: ICD-10-CM

## 2024-12-20 DIAGNOSIS — E87.6: ICD-10-CM

## 2024-12-20 DIAGNOSIS — F10.139: Primary | ICD-10-CM

## 2024-12-20 DIAGNOSIS — Z79.899: ICD-10-CM

## 2024-12-20 DIAGNOSIS — Z91.09: ICD-10-CM

## 2024-12-20 DIAGNOSIS — J44.0: ICD-10-CM

## 2024-12-20 DIAGNOSIS — H54.7: ICD-10-CM

## 2024-12-20 DIAGNOSIS — Z53.29: ICD-10-CM

## 2024-12-20 DIAGNOSIS — F41.9: ICD-10-CM

## 2024-12-20 DIAGNOSIS — E78.00: ICD-10-CM

## 2024-12-20 DIAGNOSIS — Z79.51: ICD-10-CM
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
## 2024-12-20 DIAGNOSIS — R45.851: ICD-10-CM

## 2024-12-20 DIAGNOSIS — J40: ICD-10-CM

## 2024-12-20 DIAGNOSIS — Z90.89: ICD-10-CM

## 2024-12-20 DIAGNOSIS — F32.A: ICD-10-CM

## 2024-12-20 DIAGNOSIS — Z91.048: ICD-10-CM

## 2024-12-20 LAB
ALBUMIN SERPL-MCNC: 3.51 G/DL (ref 3.4–5)
ALP SERPL-CCNC: 72 U/L (ref 46–116)
ALT SERPL-CCNC: 33 U/L (ref 14–63)
AMPHET UR QL SCN: NEGATIVE
AMPHET UR QL SCN: NEGATIVE
ANION GAP SERPL CALC-SCNC: 18.8 MMOL/L (ref 5–15)
APAP SERPL-SCNC: < 0 UG/ML (ref 10–30)
AST SERPL-CCNC: 81 U/L (ref 15–37)
BARBITURATES UR QL SCN: NEGATIVE
BASOPHILS # BLD AUTO: 0.03 10^3/UL (ref 0–0.1)
BASOPHILS NFR BLD AUTO: 0.6 % (ref 0–1)
BENZODIAZ UR QL SCN: NEGATIVE
BILIRUB SERPL-MCNC: 0.6 MG/DL (ref 0.2–1)
BUN SERPL-MCNC: 7 MG/DL (ref 7–18)
CALCIUM SERPL-MCNC: 8.3 MG/DL (ref 8.7–10.3)
CHLORIDE SERPL-SCNC: 101 MMOL/L (ref 98–107)
CO2 SERPL-SCNC: 25.3 MMOL/L (ref 21–32)
COCAINE UR QL SCN: NEGATIVE
CREAT CL 24H UR+SERPL-VRATE: 103.36 ML/MIN
CREAT SERPL-MCNC: 0.76 MG/DL (ref 0.51–1.17)
EGFRCR SERPLBLD CKD-EPI 2021: 103 ML/MIN (ref 60–?)
EOSINOPHIL # BLD AUTO: 0.05 10^3/UL (ref 0.1–0.3)
EOSINOPHIL NFR BLD AUTO: 1 % (ref 1–3)
ERYTHROCYTE [DISTWIDTH] IN BLOOD BY AUTOMATED COUNT: 14.4 % (ref 11.5–14.5)
ETHANOL BLD-MCNC: 462 MG/DL (ref ?–3)
GLUCOSE SERPL-MCNC: 116 MG/DL (ref 70–140)
HCT VFR BLD AUTO: 41.3 % (ref 40–52)
HGB BLD-MCNC: 14.9 G/DL (ref 13–17)
IMM GRANULOCYTES # BLD: 0.01 10^3/UL (ref 0–0.04)
IMM GRANULOCYTES NFR BLD: 0.2 % (ref 0–0.4)
LYMPHOCYTES # BLD AUTO: 1.24 10^3/UL (ref 1–4)
LYMPHOCYTES NFR BLD AUTO: 23.7 % (ref 20–40)
MAGNESIUM SERPL-MCNC: 1.7 MG/DL (ref 1.8–2.4)
MCH RBC QN AUTO: 32.9 PG (ref 27–31)
MCHC RBC AUTO-ENTMCNC: 36.1 G/DL (ref 32–36)
MCHC RBC AUTO-ENTMCNC: 91.2 FL (ref 82–92)
METHADONE UR QL SCN: NEGATIVE
MONOCYTES # BLD AUTO: 0.45 10^3/UL (ref 0.1–0.8)
MONOCYTES NFR BLD AUTO: 8.6 % (ref 2–8)
NEUTROPHILS # BLD AUTO: 3.45 10^3/UL (ref 2.5–7)
NEUTROPHILS NFR BLD AUTO: 65.9 % (ref 50–70)
OXYCODONE UR QL SCN: NEGATIVE
PCP UR QL SCN: NEGATIVE
PLATELET # BLD AUTO: 120 10^3/UL (ref 150–400)
PMV BLD AUTO: 9.2 FL (ref 7.4–10.4)
POTASSIUM SERPL-SCNC: 3.1 MMOL/L (ref 3.5–5.1)
PROT SERPL-MCNC: 7 G/DL (ref 6.4–8.2)
RBC # BLD AUTO: 4.53 10^6/UL (ref 4.5–6)
SODIUM SERPL-SCNC: 142 MMOL/L (ref 136–145)
TCA UR-MCNC: NEGATIVE UG/ML
THC UR QL SCN>50 NG/ML: NEGATIVE
WBC # BLD AUTO: 5.23 10^3/UL (ref 5–10)

## 2024-12-20 RX ADMIN — POTASSIUM CHLORIDE ONE MLS/HR: 200 INJECTION, SOLUTION INTRAVENOUS at 04:51

## 2024-12-20 RX ADMIN — MAGNESIUM SULFATE IN WATER ONE: 40 INJECTION, SOLUTION INTRAVENOUS at 11:26

## 2024-12-20 RX ADMIN — POTASSIUM CHLORIDE ONE MEQ: 1500 TABLET, EXTENDED RELEASE ORAL at 10:46

## 2025-01-10 ENCOUNTER — HOSPITAL ENCOUNTER (EMERGENCY)
Dept: HOSPITAL 77 - KA.ED | Age: 60
Discharge: HOME | End: 2025-01-10
Payer: MEDICAID

## 2025-01-10 VITALS — HEART RATE: 108 BPM | SYSTOLIC BLOOD PRESSURE: 160 MMHG | DIASTOLIC BLOOD PRESSURE: 106 MMHG

## 2025-01-10 DIAGNOSIS — J45.909: ICD-10-CM

## 2025-01-10 DIAGNOSIS — Z90.89: ICD-10-CM

## 2025-01-10 DIAGNOSIS — I10: ICD-10-CM

## 2025-01-10 DIAGNOSIS — Z91.048: ICD-10-CM

## 2025-01-10 DIAGNOSIS — Z79.899: ICD-10-CM

## 2025-01-10 DIAGNOSIS — Z79.51: ICD-10-CM

## 2025-01-10 DIAGNOSIS — F17.210: ICD-10-CM

## 2025-01-10 DIAGNOSIS — R07.81: Primary | ICD-10-CM

## 2025-01-16 ENCOUNTER — HOSPITAL ENCOUNTER (OUTPATIENT)
Dept: HOSPITAL 77 - KA.ED | Age: 60
Setting detail: OBSERVATION
LOS: 2 days | Discharge: HOME | End: 2025-01-18
Attending: INTERNAL MEDICINE | Admitting: INTERNAL MEDICINE
Payer: MEDICAID

## 2025-01-16 DIAGNOSIS — K70.10: ICD-10-CM

## 2025-01-16 DIAGNOSIS — G40.909: ICD-10-CM

## 2025-01-16 DIAGNOSIS — F10.239: Primary | ICD-10-CM

## 2025-01-16 DIAGNOSIS — F17.210: ICD-10-CM

## 2025-01-16 DIAGNOSIS — E78.00: ICD-10-CM

## 2025-01-16 DIAGNOSIS — I16.0: ICD-10-CM

## 2025-01-16 DIAGNOSIS — E87.6: ICD-10-CM

## 2025-01-16 DIAGNOSIS — J45.909: ICD-10-CM

## 2025-01-16 DIAGNOSIS — I10: ICD-10-CM

## 2025-01-16 DIAGNOSIS — R55: ICD-10-CM

## 2025-01-16 DIAGNOSIS — Z79.899: ICD-10-CM

## 2025-01-16 DIAGNOSIS — Z91.048: ICD-10-CM

## 2025-01-16 LAB
ALBUMIN SERPL-MCNC: 3.13 G/DL (ref 3.4–5)
ALP SERPL-CCNC: 154 U/L (ref 46–116)
ALT SERPL-CCNC: 107 U/L (ref 14–63)
ANION GAP SERPL CALC-SCNC: 20.7 MMOL/L (ref 5–15)
APPEARANCE UR: CLEAR
AST SERPL-CCNC: 231 U/L (ref 15–37)
BACTERIA URNS QL MICRO: (no result) /HPF
BILIRUB SERPL-MCNC: 1.2 MG/DL (ref 0.2–1)
BILIRUB UR STRIP-MCNC: NEGATIVE MG/DL
BUN SERPL-MCNC: 12 MG/DL (ref 7–18)
CALCIUM SERPL-MCNC: 8 MG/DL (ref 8.7–10.3)
CHLORIDE SERPL-SCNC: 99 MMOL/L (ref 98–107)
CO2 SERPL-SCNC: 22.5 MMOL/L (ref 21–32)
COLOR UR: (no result)
CREAT CL 24H UR+SERPL-VRATE: (no result) ML/MIN
CREAT SERPL-MCNC: 0.67 MG/DL (ref 0.51–1.17)
EGFRCR SERPLBLD CKD-EPI 2021: 107 ML/MIN (ref 60–?)
EPI CELLS #/AREA URNS HPF: (no result) /LPF
ERYTHROCYTE [DISTWIDTH] IN BLOOD BY AUTOMATED COUNT: 16.8 % (ref 11.5–14.5)
ETHANOL BLD-MCNC: 106 MG/DL (ref ?–3)
GLUCOSE SERPL-MCNC: 156 MG/DL (ref 70–140)
GLUCOSE UR STRIP-MCNC: NEGATIVE MG/DL
HCT VFR BLD AUTO: 38.7 % (ref 40–52)
HGB BLD-MCNC: 14.1 G/DL (ref 13–17)
KETONES UR STRIP-MCNC: NEGATIVE MG/DL
LIPASE SERPL-CCNC: 62 U/L (ref 16–77)
LYMPHOCYTES NFR BLD MANUAL: 2 % (ref 20–40)
MCH RBC QN AUTO: 33.8 PG (ref 27–31)
MCHC RBC AUTO-ENTMCNC: 36.4 G/DL (ref 32–36)
MCHC RBC AUTO-ENTMCNC: 92.8 FL (ref 82–92)
MONOCYTES NFR BLD MANUAL: 2 % (ref 2–8)
NEUTROPHILS NFR BLD MANUAL: 96 % (ref 50–70)
NITRITE UR QL: NEGATIVE
PH UR STRIP: 6 [PH] (ref 5–9)
PLATELET # BLD AUTO: 106 10^3/UL (ref 150–400)
PLATELET BLD QL SMEAR: (no result)
PMV BLD AUTO: 8.8 FL (ref 7.4–10.4)
POTASSIUM SERPL-SCNC: 3.2 MMOL/L (ref 3.5–5.1)
PROT SERPL-MCNC: 6.5 G/DL (ref 6.4–8.2)
PROT UR STRIP-MCNC: (no result) MG/DL
RBC # BLD AUTO: 4.17 10^6/UL (ref 4.5–6)
RBC # URNS HPF: (no result) /HPF (ref 0–5)
RBC UR QL: NEGATIVE
SODIUM SERPL-SCNC: 139 MMOL/L (ref 136–145)
SP GR UR STRIP: >= 1.03 (ref 1–1.03)
UROBILINOGEN UR STRIP-ACNC: 0.2 E.U./DL (ref 0.2–1)
WBC # BLD AUTO: 10.01 10^3/UL (ref 5–10)
WBC UR QL: (no result) /HPF (ref 0–5)

## 2025-01-16 RX ADMIN — SODIUM CHLORIDE AND POTASSIUM CHLORIDE SCH MLS/HR: .9; .15 SOLUTION INTRAVENOUS at 21:59

## 2025-01-16 RX ADMIN — DEXTROSE, SODIUM CHLORIDE, AND POTASSIUM CHLORIDE SCH MLS/HR: 5; .45; .3 INJECTION INTRAVENOUS at 15:15

## 2025-01-16 RX ADMIN — FOLIC ACID STA MG: 5 INJECTION, SOLUTION INTRAMUSCULAR; INTRAVENOUS; SUBCUTANEOUS at 14:43

## 2025-01-16 RX ADMIN — MAGNESIUM SULFATE IN WATER ONE MLS/HR: 40 INJECTION, SOLUTION INTRAVENOUS at 14:48

## 2025-01-16 RX ADMIN — LORAZEPAM PRN MG: 2 INJECTION INTRAMUSCULAR; INTRAVENOUS at 22:34

## 2025-01-16 RX ADMIN — LORAZEPAM ONE MG: 2 INJECTION INTRAMUSCULAR; INTRAVENOUS at 14:04

## 2025-01-17 LAB
ALBUMIN SERPL-MCNC: 3 G/DL (ref 3.4–5)
ALP SERPL-CCNC: 155 U/L (ref 46–116)
ALT SERPL-CCNC: 98 U/L (ref 14–63)
ANION GAP SERPL CALC-SCNC: 15.3 MMOL/L (ref 5–15)
AST SERPL-CCNC: 224 U/L (ref 15–37)
BILIRUB SERPL-MCNC: 2.3 MG/DL (ref 0.2–1)
BUN SERPL-MCNC: 5 MG/DL (ref 7–18)
CALCIUM SERPL-MCNC: 7.7 MG/DL (ref 8.7–10.3)
CHLORIDE SERPL-SCNC: 100 MMOL/L (ref 98–107)
CO2 SERPL-SCNC: 24.5 MMOL/L (ref 21–32)
CREAT CL 24H UR+SERPL-VRATE: 138.6 ML/MIN
CREAT SERPL-MCNC: 0.6 MG/DL (ref 0.51–1.17)
EGFRCR SERPLBLD CKD-EPI 2021: 111 ML/MIN (ref 60–?)
ERYTHROCYTE [DISTWIDTH] IN BLOOD BY AUTOMATED COUNT: 16.7 % (ref 11.5–14.5)
GLUCOSE SERPL-MCNC: 108 MG/DL (ref 70–140)
HCT VFR BLD AUTO: 36.6 % (ref 40–52)
HGB BLD-MCNC: 13.3 G/DL (ref 13–17)
MAGNESIUM SERPL-MCNC: 1.5 MG/DL (ref 1.8–2.4)
MCH RBC QN AUTO: 34 PG (ref 27–31)
MCHC RBC AUTO-ENTMCNC: 36.3 G/DL (ref 32–36)
MCHC RBC AUTO-ENTMCNC: 93.6 FL (ref 82–92)
PLATELET # BLD AUTO: 97 10^3/UL (ref 150–400)
PMV BLD AUTO: 8.9 FL (ref 7.4–10.4)
POTASSIUM SERPL-SCNC: 3.8 MMOL/L (ref 3.5–5.1)
PROT SERPL-MCNC: 6.4 G/DL (ref 6.4–8.2)
RBC # BLD AUTO: 3.91 10^6/UL (ref 4.5–6)
SODIUM SERPL-SCNC: 136 MMOL/L (ref 136–145)
WBC # BLD AUTO: 5.14 10^3/UL (ref 5–10)

## 2025-01-17 RX ADMIN — IOPAMIDOL ONE ML: 755 INJECTION, SOLUTION INTRAVENOUS at 14:13

## 2025-01-18 RX ADMIN — LORAZEPAM PRN MG: 2 INJECTION INTRAMUSCULAR; INTRAVENOUS at 00:44

## 2025-02-10 ENCOUNTER — HOSPITAL ENCOUNTER (EMERGENCY)
Dept: HOSPITAL 77 - KA.ED | Age: 60
Discharge: HOME | End: 2025-02-10
Payer: MEDICAID

## 2025-02-10 DIAGNOSIS — Z91.048: ICD-10-CM

## 2025-02-10 DIAGNOSIS — Z91.018: ICD-10-CM

## 2025-02-10 DIAGNOSIS — S01.01XA: Primary | ICD-10-CM

## 2025-02-10 DIAGNOSIS — E78.00: ICD-10-CM
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
## 2025-02-10 DIAGNOSIS — I10: ICD-10-CM
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
## 2025-02-10 DIAGNOSIS — Z79.899: ICD-10-CM

## 2025-02-10 DIAGNOSIS — E87.6: ICD-10-CM

## 2025-02-10 LAB
ALBUMIN SERPL-MCNC: 3.52 G/DL (ref 3.4–5)
ALP SERPL-CCNC: 197 U/L (ref 46–116)
ALT SERPL-CCNC: 65 U/L (ref 14–63)
ANION GAP SERPL CALC-SCNC: 25.3 MMOL/L (ref 5–15)
AST SERPL-CCNC: 129 U/L (ref 15–37)
BASOPHILS # BLD AUTO: 0.02 10^3/UL (ref 0–0.1)
BASOPHILS NFR BLD AUTO: 0.3 % (ref 0–1)
BILIRUB SERPL-MCNC: 2 MG/DL (ref 0.2–1)
BUN SERPL-MCNC: 7 MG/DL (ref 7–18)
CALCIUM SERPL-MCNC: 8.3 MG/DL (ref 8.7–10.3)
CHLORIDE SERPL-SCNC: 96 MMOL/L (ref 98–107)
CK SERPL-CCNC: 90 U/L (ref 26–276)
CO2 SERPL-SCNC: 20.7 MMOL/L (ref 21–32)
CREAT CL 24H UR+SERPL-VRATE: 85.73 ML/MIN
CREAT SERPL-MCNC: 0.97 MG/DL (ref 0.51–1.17)
EGFRCR SERPLBLD CKD-EPI 2021: 89 ML/MIN (ref 60–?)
EOSINOPHIL # BLD AUTO: 0.01 10^3/UL (ref 0.1–0.3)
EOSINOPHIL NFR BLD AUTO: 0.1 % (ref 1–3)
ERYTHROCYTE [DISTWIDTH] IN BLOOD BY AUTOMATED COUNT: 15.5 % (ref 11.5–14.5)
ETHANOL BLD-MCNC: < 3 MG/DL (ref ?–3)
GLUCOSE SERPL-MCNC: 179 MG/DL (ref 70–140)
HCT VFR BLD AUTO: 39.7 % (ref 40–52)
HGB BLD-MCNC: 14.2 G/DL (ref 13–17)
IMM GRANULOCYTES # BLD: 0.02 10^3/UL (ref 0–0.04)
IMM GRANULOCYTES NFR BLD: 0.3 % (ref 0–0.4)
INR PPP: 1.1 (ref 0.9–1.1)
LYMPHOCYTES # BLD AUTO: 0.59 10^3/UL (ref 1–4)
LYMPHOCYTES NFR BLD AUTO: 8.1 % (ref 20–40)
MCH RBC QN AUTO: 34.6 PG (ref 27–31)
MCHC RBC AUTO-ENTMCNC: 35.8 G/DL (ref 32–36)
MCHC RBC AUTO-ENTMCNC: 96.8 FL (ref 82–92)
MONOCYTES # BLD AUTO: 0.36 10^3/UL (ref 0.1–0.8)
MONOCYTES NFR BLD AUTO: 4.9 % (ref 2–8)
NEUTROPHILS # BLD AUTO: 6.3 10^3/UL (ref 2.5–7)
NEUTROPHILS NFR BLD AUTO: 86.3 % (ref 50–70)
PLATELET # BLD AUTO: 102 10^3/UL (ref 150–400)
PMV BLD AUTO: 9.1 FL (ref 7.4–10.4)
POTASSIUM SERPL-SCNC: 3 MMOL/L (ref 3.5–5.1)
PROT SERPL-MCNC: 6.9 G/DL (ref 6.4–8.2)
PROTHROMBIN TIME: 11.6 SEC (ref 9.1–12)
RBC # BLD AUTO: 4.1 10^6/UL (ref 4.5–6)
SODIUM SERPL-SCNC: 139 MMOL/L (ref 136–145)
WBC # BLD AUTO: 7.3 10^3/UL (ref 5–10)

## 2025-02-10 RX ADMIN — ONDANSETRON ONE MG: 2 INJECTION, SOLUTION INTRAMUSCULAR; INTRAVENOUS at 15:00

## 2025-02-10 RX ADMIN — POTASSIUM CHLORIDE ONE MEQ: 750 TABLET, FILM COATED, EXTENDED RELEASE ORAL at 16:05

## 2025-02-10 RX ADMIN — FENTANYL CITRATE ONE MCG: 50 INJECTION, SOLUTION INTRAMUSCULAR; INTRAVENOUS at 15:08

## 2025-02-10 RX ADMIN — LIDOCAINE HYDROCHLORIDE AND EPINEPHRINE ONE ML: 10; 10 INJECTION, SOLUTION INFILTRATION; PERINEURAL at 15:08

## 2025-03-28 ENCOUNTER — HOSPITAL ENCOUNTER (INPATIENT)
Dept: HOSPITAL 77 - KA.ED | Age: 60
LOS: 1 days | Discharge: HOME | DRG: 101 | End: 2025-03-29
Attending: PHYSICIAN ASSISTANT | Admitting: PHYSICIAN ASSISTANT
Payer: MEDICAID

## 2025-03-28 DIAGNOSIS — H54.7: ICD-10-CM

## 2025-03-28 DIAGNOSIS — E87.6: ICD-10-CM

## 2025-03-28 DIAGNOSIS — F32.A: ICD-10-CM

## 2025-03-28 DIAGNOSIS — E78.00: ICD-10-CM

## 2025-03-28 DIAGNOSIS — E83.42: ICD-10-CM

## 2025-03-28 DIAGNOSIS — J45.909: ICD-10-CM

## 2025-03-28 DIAGNOSIS — G40.909: Primary | ICD-10-CM

## 2025-03-28 DIAGNOSIS — F10.239: ICD-10-CM

## 2025-03-28 DIAGNOSIS — J44.9: ICD-10-CM

## 2025-03-28 DIAGNOSIS — E87.1: ICD-10-CM

## 2025-03-28 DIAGNOSIS — Z88.8: ICD-10-CM

## 2025-03-28 DIAGNOSIS — I10: ICD-10-CM

## 2025-03-28 DIAGNOSIS — Z72.0: ICD-10-CM

## 2025-03-28 DIAGNOSIS — E86.0: ICD-10-CM

## 2025-03-28 DIAGNOSIS — Z90.49: ICD-10-CM

## 2025-03-28 DIAGNOSIS — F41.9: ICD-10-CM

## 2025-03-28 DIAGNOSIS — Z79.899: ICD-10-CM

## 2025-03-28 LAB
ALBUMIN SERPL-MCNC: 3.58 G/DL (ref 3.4–5)
ALP SERPL-CCNC: 136 U/L (ref 46–116)
ALT SERPL-CCNC: 55 U/L (ref 14–63)
ANION GAP SERPL CALC-SCNC: 14.8 MMOL/L (ref 5–15)
ANION GAP SERPL CALC-SCNC: 15.8 MMOL/L (ref 5–15)
AST SERPL-CCNC: 86 U/L (ref 15–37)
BASOPHILS # BLD AUTO: 0.03 10^3/UL (ref 0–0.1)
BASOPHILS NFR BLD AUTO: 0.3 % (ref 0–1)
BILIRUB SERPL-MCNC: 1.4 MG/DL (ref 0.2–1)
BUN SERPL-MCNC: 11 MG/DL (ref 7–18)
BUN SERPL-MCNC: 6 MG/DL (ref 7–18)
CALCIUM SERPL-MCNC: 7.7 MG/DL (ref 8.7–10.3)
CALCIUM SERPL-MCNC: 8.7 MG/DL (ref 8.7–10.3)
CHLORIDE SERPL-SCNC: 92 MMOL/L (ref 98–107)
CHLORIDE SERPL-SCNC: 98 MMOL/L (ref 98–107)
CO2 SERPL-SCNC: 21.9 MMOL/L (ref 21–32)
CO2 SERPL-SCNC: 27.9 MMOL/L (ref 21–32)
CREAT CL 24H UR+SERPL-VRATE: 75.42 ML/MIN
CREAT CL 24H UR+SERPL-VRATE: 92.18 ML/MIN
CREAT SERPL-MCNC: 0.81 MG/DL (ref 0.51–1.17)
CREAT SERPL-MCNC: 0.99 MG/DL (ref 0.51–1.17)
EGFRCR SERPLBLD CKD-EPI 2021: 101 ML/MIN (ref 60–?)
EGFRCR SERPLBLD CKD-EPI 2021: 87 ML/MIN (ref 60–?)
EOSINOPHIL # BLD AUTO: 0 10^3/UL (ref 0.1–0.3)
EOSINOPHIL NFR BLD AUTO: 0 % (ref 1–3)
ERYTHROCYTE [DISTWIDTH] IN BLOOD BY AUTOMATED COUNT: 13.3 % (ref 11.5–14.5)
ETHANOL BLD-MCNC: < 3 MG/DL (ref ?–3)
GLUCOSE SERPL-MCNC: 116 MG/DL (ref 70–140)
GLUCOSE SERPL-MCNC: 127 MG/DL (ref 70–140)
HCT VFR BLD AUTO: 41.2 % (ref 40–52)
HGB BLD-MCNC: 14.4 G/DL (ref 13–17)
IMM GRANULOCYTES # BLD: 0.02 10^3/UL (ref 0–0.04)
IMM GRANULOCYTES NFR BLD: 0.2 % (ref 0–0.4)
LYMPHOCYTES # BLD AUTO: 0.77 10^3/UL (ref 1–4)
LYMPHOCYTES NFR BLD AUTO: 8 % (ref 20–40)
MAGNESIUM SERPL-MCNC: 1.4 MG/DL (ref 1.8–2.4)
MCH RBC QN AUTO: 35.7 PG (ref 27–31)
MCHC RBC AUTO-ENTMCNC: 102.2 FL (ref 82–92)
MCHC RBC AUTO-ENTMCNC: 35 G/DL (ref 32–36)
MONOCYTES # BLD AUTO: 0.55 10^3/UL (ref 0.1–0.8)
MONOCYTES NFR BLD AUTO: 5.7 % (ref 2–8)
NEUTROPHILS # BLD AUTO: 8.29 10^3/UL (ref 2.5–7)
NEUTROPHILS NFR BLD AUTO: 85.8 % (ref 50–70)
PLATELET # BLD AUTO: 246 10^3/UL (ref 150–400)
PMV BLD AUTO: 8.7 FL (ref 7.4–10.4)
POTASSIUM SERPL-SCNC: 2.7 MMOL/L (ref 3.5–5.1)
POTASSIUM SERPL-SCNC: 2.7 MMOL/L (ref 3.5–5.1)
PROT SERPL-MCNC: 7.5 G/DL (ref 6.4–8.2)
RBC # BLD AUTO: 4.03 10^6/UL (ref 4.5–6)
SODIUM SERPL-SCNC: 132 MMOL/L (ref 136–145)
SODIUM SERPL-SCNC: 133 MMOL/L (ref 136–145)
WBC # BLD AUTO: 9.66 10^3/UL (ref 5–10)

## 2025-03-28 RX ADMIN — LORAZEPAM ONE MG: 2 INJECTION INTRAMUSCULAR; INTRAVENOUS at 16:55

## 2025-03-28 RX ADMIN — POTASSIUM CHLORIDE ONE MEQ: 1500 TABLET, EXTENDED RELEASE ORAL at 23:12

## 2025-03-28 RX ADMIN — POTASSIUM CHLORIDE ONE MLS/HR: 200 INJECTION, SOLUTION INTRAVENOUS at 18:00

## 2025-03-29 VITALS — DIASTOLIC BLOOD PRESSURE: 81 MMHG | HEART RATE: 101 BPM | SYSTOLIC BLOOD PRESSURE: 128 MMHG

## 2025-03-29 LAB
ANION GAP SERPL CALC-SCNC: 10.9 MMOL/L (ref 5–15)
ANION GAP SERPL CALC-SCNC: 11.1 MMOL/L (ref 5–15)
BASOPHILS # BLD AUTO: 0.04 10^3/UL (ref 0–0.1)
BASOPHILS # BLD AUTO: 0.04 10^3/UL (ref 0–0.1)
BASOPHILS NFR BLD AUTO: 0.7 % (ref 0–1)
BASOPHILS NFR BLD AUTO: 0.8 % (ref 0–1)
BUN SERPL-MCNC: 5 MG/DL (ref 7–18)
BUN SERPL-MCNC: 5 MG/DL (ref 7–18)
CALCIUM SERPL-MCNC: 7.4 MG/DL (ref 8.7–10.3)
CALCIUM SERPL-MCNC: 7.6 MG/DL (ref 8.7–10.3)
CHLORIDE SERPL-SCNC: 101 MMOL/L (ref 98–107)
CHLORIDE SERPL-SCNC: 102 MMOL/L (ref 98–107)
CO2 SERPL-SCNC: 24.7 MMOL/L (ref 21–32)
CO2 SERPL-SCNC: 26 MMOL/L (ref 21–32)
CREAT CL 24H UR+SERPL-VRATE: 102.03 ML/MIN
CREAT CL 24H UR+SERPL-VRATE: 103.49 ML/MIN
CREAT SERPL-MCNC: 0.7 MG/DL (ref 0.51–1.17)
CREAT SERPL-MCNC: 0.71 MG/DL (ref 0.51–1.17)
EGFRCR SERPLBLD CKD-EPI 2021: 105 ML/MIN (ref 60–?)
EGFRCR SERPLBLD CKD-EPI 2021: 105 ML/MIN (ref 60–?)
EOSINOPHIL # BLD AUTO: 0.02 10^3/UL (ref 0.1–0.3)
EOSINOPHIL # BLD AUTO: 0.03 10^3/UL (ref 0.1–0.3)
EOSINOPHIL NFR BLD AUTO: 0.3 % (ref 1–3)
EOSINOPHIL NFR BLD AUTO: 0.6 % (ref 1–3)
ERYTHROCYTE [DISTWIDTH] IN BLOOD BY AUTOMATED COUNT: 13.4 % (ref 11.5–14.5)
ERYTHROCYTE [DISTWIDTH] IN BLOOD BY AUTOMATED COUNT: 13.5 % (ref 11.5–14.5)
GLUCOSE SERPL-MCNC: 101 MG/DL (ref 70–140)
GLUCOSE SERPL-MCNC: 112 MG/DL (ref 70–140)
HCT VFR BLD AUTO: 32.5 % (ref 40–52)
HCT VFR BLD AUTO: 33.9 % (ref 40–52)
HGB BLD-MCNC: 11.5 G/DL (ref 13–17)
HGB BLD-MCNC: 11.8 G/DL (ref 13–17)
IMM GRANULOCYTES # BLD: 0 10^3/UL (ref 0–0.04)
IMM GRANULOCYTES # BLD: 0.01 10^3/UL (ref 0–0.04)
IMM GRANULOCYTES NFR BLD: 0 % (ref 0–0.4)
IMM GRANULOCYTES NFR BLD: 0.2 % (ref 0–0.4)
LYMPHOCYTES # BLD AUTO: 0.62 10^3/UL (ref 1–4)
LYMPHOCYTES # BLD AUTO: 0.78 10^3/UL (ref 1–4)
LYMPHOCYTES NFR BLD AUTO: 10.2 % (ref 20–40)
LYMPHOCYTES NFR BLD AUTO: 16.4 % (ref 20–40)
Lab: 23 MMOL/L (ref 22–29)
Lab: 90 %
MAGNESIUM SERPL-MCNC: 1.6 MG/DL (ref 1.8–2.4)
MAGNESIUM SERPL-MCNC: 2.2 MG/DL (ref 1.8–2.4)
MCH RBC QN AUTO: 36.7 PG (ref 27–31)
MCH RBC QN AUTO: 36.9 PG (ref 27–31)
MCHC RBC AUTO-ENTMCNC: 103.8 FL (ref 82–92)
MCHC RBC AUTO-ENTMCNC: 105.9 FL (ref 82–92)
MCHC RBC AUTO-ENTMCNC: 34.8 G/DL (ref 32–36)
MCHC RBC AUTO-ENTMCNC: 35.4 G/DL (ref 32–36)
MONOCYTES # BLD AUTO: 0.3 10^3/UL (ref 0.1–0.8)
MONOCYTES # BLD AUTO: 0.4 10^3/UL (ref 0.1–0.8)
MONOCYTES NFR BLD AUTO: 6.3 % (ref 2–8)
MONOCYTES NFR BLD AUTO: 6.6 % (ref 2–8)
NEUTROPHILS # BLD AUTO: 3.62 10^3/UL (ref 2.5–7)
NEUTROPHILS # BLD AUTO: 5.01 10^3/UL (ref 2.5–7)
NEUTROPHILS NFR BLD AUTO: 75.9 % (ref 50–70)
NEUTROPHILS NFR BLD AUTO: 82 % (ref 50–70)
PCO2 BLDV: 32 MMHG (ref 41–51)
PH BLDV: 7.46 PH (ref 7.32–7.43)
PLATELET # BLD AUTO: 186 10^3/UL (ref 150–400)
PLATELET # BLD AUTO: 201 10^3/UL (ref 150–400)
PMV BLD AUTO: 8.7 FL (ref 7.4–10.4)
PMV BLD AUTO: 8.8 FL (ref 7.4–10.4)
PO2 BLDV: 55 MMHG
POTASSIUM SERPL-SCNC: 2.8 MMOL/L (ref 3.5–5.1)
POTASSIUM SERPL-SCNC: 3.9 MMOL/L (ref 3.5–5.1)
RBC # BLD AUTO: 3.13 10^6/UL (ref 4.5–6)
RBC # BLD AUTO: 3.2 10^6/UL (ref 4.5–6)
SODIUM SERPL-SCNC: 134 MMOL/L (ref 136–145)
SODIUM SERPL-SCNC: 135 MMOL/L (ref 136–145)
WBC # BLD AUTO: 4.77 10^3/UL (ref 5–10)
WBC # BLD AUTO: 6.1 10^3/UL (ref 5–10)

## 2025-03-29 RX ADMIN — CYANOCOBALAMIN TAB 500 MCG SCH MCG: 500 TAB at 08:32

## 2025-03-29 RX ADMIN — POTASSIUM CHLORIDE SCH MEQ: 1500 TABLET, EXTENDED RELEASE ORAL at 10:13

## 2025-04-15 ENCOUNTER — HOSPITAL ENCOUNTER (INPATIENT)
Dept: HOSPITAL 77 - KA.ED | Age: 60
LOS: 10 days | Discharge: HOME HEALTH | End: 2025-04-25
Attending: HOSPITALIST | Admitting: HOSPITALIST
Payer: MEDICAID

## 2025-04-15 DIAGNOSIS — Z79.51: ICD-10-CM

## 2025-04-15 DIAGNOSIS — Z86.16: ICD-10-CM

## 2025-04-15 DIAGNOSIS — F32.2: ICD-10-CM

## 2025-04-15 DIAGNOSIS — F10.220: Primary | ICD-10-CM

## 2025-04-15 DIAGNOSIS — Z90.89: ICD-10-CM

## 2025-04-15 DIAGNOSIS — K21.9: ICD-10-CM
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
## 2025-04-15 DIAGNOSIS — G40.909: ICD-10-CM

## 2025-04-15 DIAGNOSIS — H54.7: ICD-10-CM

## 2025-04-15 DIAGNOSIS — J20.9: ICD-10-CM

## 2025-04-15 DIAGNOSIS — F43.21: ICD-10-CM

## 2025-04-15 DIAGNOSIS — E83.42: ICD-10-CM

## 2025-04-15 DIAGNOSIS — R45.851: ICD-10-CM

## 2025-04-15 DIAGNOSIS — F10.230: ICD-10-CM

## 2025-04-15 DIAGNOSIS — J44.0: ICD-10-CM

## 2025-04-15 DIAGNOSIS — I10: ICD-10-CM

## 2025-04-15 DIAGNOSIS — E86.0: ICD-10-CM

## 2025-04-15 DIAGNOSIS — F17.210: ICD-10-CM

## 2025-04-15 DIAGNOSIS — F41.9: ICD-10-CM

## 2025-04-15 DIAGNOSIS — E78.00: ICD-10-CM

## 2025-04-15 DIAGNOSIS — Z79.899: ICD-10-CM

## 2025-04-15 LAB
ALBUMIN SERPL-MCNC: 3.12 G/DL (ref 3.4–5)
AMPHET UR QL SCN: NEGATIVE
AMPHET UR QL SCN: NEGATIVE
ANION GAP SERPL CALC-SCNC: 18.5 MMOL/L (ref 5–15)
BARBITURATES UR QL SCN: NEGATIVE
BASOPHILS # BLD AUTO: 0.04 10^3/UL (ref 0–0.1)
BASOPHILS NFR BLD AUTO: 0.4 % (ref 0–1)
BENZODIAZ UR QL SCN: NEGATIVE
BILIRUB SERPL-MCNC: 1.4 MG/DL (ref 0.2–1)
CALCIUM SERPL-MCNC: 8.4 MG/DL (ref 8.7–10.3)
COCAINE UR QL SCN: NEGATIVE
CREAT CL 24H UR+SERPL-VRATE: 126.77 ML/MIN
CREAT SERPL-MCNC: 0.66 MG/DL (ref 0.51–1.17)
EOSINOPHIL # BLD AUTO: 0.02 10^3/UL (ref 0.1–0.3)
EOSINOPHIL NFR BLD AUTO: 0.2 % (ref 1–3)
HGB BLD-MCNC: 14.9 G/DL (ref 13–17)
IMM GRANULOCYTES # BLD: 0.01 10^3/UL (ref 0–0.04)
IMM GRANULOCYTES NFR BLD: 0.1 % (ref 0–0.4)
LYMPHOCYTES # BLD AUTO: 0.94 10^3/UL (ref 1–4)
LYMPHOCYTES NFR BLD AUTO: 10.2 % (ref 20–40)
MAGNESIUM SERPL-MCNC: 1.5 MG/DL (ref 1.8–2.4)
MCH RBC QN AUTO: 35.6 PG (ref 27–31)
MCHC RBC AUTO-ENTMCNC: 100.5 FL (ref 82–92)
MCHC RBC AUTO-ENTMCNC: 35.5 G/DL (ref 32–36)
METHADONE UR QL SCN: NEGATIVE
MONOCYTES # BLD AUTO: 0.44 10^3/UL (ref 0.1–0.8)
MONOCYTES NFR BLD AUTO: 4.8 % (ref 2–8)
NEUTROPHILS # BLD AUTO: 7.78 10^3/UL (ref 2.5–7)
NEUTROPHILS NFR BLD AUTO: 84.3 % (ref 50–70)
OXYCODONE UR QL SCN: NEGATIVE
PCP UR QL SCN: NEGATIVE
PLATELET # BLD AUTO: 209 10^3/UL (ref 150–400)
POTASSIUM SERPL-SCNC: 3.5 MMOL/L (ref 3.5–5.1)
RBC # BLD AUTO: 4.18 10^6/UL (ref 4.5–6)
TCA UR-MCNC: NEGATIVE UG/ML
THC UR QL SCN>50 NG/ML: NEGATIVE
WBC # BLD AUTO: 9.23 10^3/UL (ref 5–10)

## 2025-04-15 RX ADMIN — MAGNESIUM SULFATE IN WATER ONE MLS/HR: 40 INJECTION, SOLUTION INTRAVENOUS at 20:26

## 2025-04-15 RX ADMIN — SODIUM CHLORIDE ONE MG: 9 INJECTION, SOLUTION INTRAVENOUS at 20:17

## 2025-04-16 LAB
ANION GAP SERPL CALC-SCNC: 11.6 MMOL/L (ref 5–15)
CALCIUM SERPL-MCNC: 7.9 MG/DL (ref 8.7–10.3)
CO2 SERPL-SCNC: 27.3 MMOL/L (ref 21–32)
CREAT CL 24H UR+SERPL-VRATE: 130.73 ML/MIN
CREAT SERPL-MCNC: 0.64 MG/DL (ref 0.51–1.17)
MAGNESIUM SERPL-MCNC: 1.5 MG/DL (ref 1.8–2.4)
POTASSIUM SERPL-SCNC: 3.9 MMOL/L (ref 3.5–5.1)

## 2025-04-16 RX ADMIN — Medication SCH GM: at 10:03

## 2025-04-16 RX ADMIN — MAGNESIUM SULFATE IN WATER ONE MLS/HR: 40 INJECTION, SOLUTION INTRAVENOUS at 10:01

## 2025-04-16 RX ADMIN — CYANOCOBALAMIN TAB 500 MCG SCH MCG: 500 TAB at 10:03

## 2025-04-17 LAB
ANION GAP SERPL CALC-SCNC: 12.9 MMOL/L (ref 5–15)
CALCIUM SERPL-MCNC: 7.9 MG/DL (ref 8.7–10.3)
CO2 SERPL-SCNC: 27.6 MMOL/L (ref 21–32)
CREAT CL 24H UR+SERPL-VRATE: 111.56 ML/MIN
CREAT SERPL-MCNC: 0.75 MG/DL (ref 0.51–1.17)
POTASSIUM SERPL-SCNC: 3.5 MMOL/L (ref 3.5–5.1)

## 2025-04-18 RX ADMIN — GUAIFENESIN AND DEXTROMETHORPHAN PRN ML: 100; 10 SYRUP ORAL at 22:55

## 2025-04-19 RX ADMIN — SODIUM CHLORIDE ONE: 9 INJECTION, SOLUTION INTRAVENOUS at 03:00

## 2025-04-25 VITALS — HEART RATE: 94 BPM | SYSTOLIC BLOOD PRESSURE: 142 MMHG | DIASTOLIC BLOOD PRESSURE: 77 MMHG

## 2025-05-05 ENCOUNTER — HOSPITAL ENCOUNTER (EMERGENCY)
Dept: HOSPITAL 77 - KA.ED | Age: 60
LOS: 1 days | Discharge: HOME | End: 2025-05-06
Payer: MEDICAID

## 2025-05-05 DIAGNOSIS — Z79.899: ICD-10-CM

## 2025-05-05 DIAGNOSIS — E78.00: ICD-10-CM

## 2025-05-05 DIAGNOSIS — F10.220: Primary | ICD-10-CM

## 2025-05-05 DIAGNOSIS — K21.9: ICD-10-CM

## 2025-05-05 DIAGNOSIS — F32.2: ICD-10-CM

## 2025-05-05 DIAGNOSIS — Z91.048: ICD-10-CM

## 2025-05-05 DIAGNOSIS — Z86.16: ICD-10-CM

## 2025-05-05 DIAGNOSIS — J45.909: ICD-10-CM

## 2025-05-05 DIAGNOSIS — I10: ICD-10-CM

## 2025-05-05 LAB
AMPHET UR QL SCN: NEGATIVE
AMPHET UR QL SCN: NEGATIVE
ANION GAP SERPL CALC-SCNC: 16.4 MMOL/L (ref 5–15)
BARBITURATES UR QL SCN: NEGATIVE
BASOPHILS # BLD AUTO: 0.06 10^3/UL (ref 0–0.1)
BENZODIAZ UR QL SCN: POSITIVE
BUN SERPL-MCNC: 13 MG/DL (ref 7–18)
CALCIUM SERPL-MCNC: 8.7 MG/DL (ref 8.7–10.3)
CHLORIDE SERPL-SCNC: 101 MMOL/L (ref 98–107)
CO2 SERPL-SCNC: 23.1 MMOL/L (ref 21–32)
COCAINE UR QL SCN: NEGATIVE
CREAT CL 24H UR+SERPL-VRATE: (no result) ML/MIN
CREAT SERPL-MCNC: 0.84 MG/DL (ref 0.51–1.17)
EGFRCR SERPLBLD CKD-EPI 2021: 100 ML/MIN (ref 60–?)
EOSINOPHIL # BLD AUTO: 0.07 10^3/UL (ref 0.1–0.3)
EOSINOPHIL NFR BLD AUTO: 1.2 % (ref 1–3)
ERYTHROCYTE [DISTWIDTH] IN BLOOD BY AUTOMATED COUNT: 12.7 % (ref 11.5–14.5)
ETHANOL BLD-MCNC: 277 MG/DL (ref ?–3)
GLUCOSE SERPL-MCNC: 91 MG/DL (ref 70–140)
HCT VFR BLD AUTO: 38.1 % (ref 40–52)
HGB BLD-MCNC: 13.1 G/DL (ref 13–17)
LYMPHOCYTES # BLD AUTO: 1.46 10^3/UL (ref 1–4)
LYMPHOCYTES NFR BLD AUTO: 24.8 % (ref 20–40)
MCH RBC QN AUTO: 33.9 PG (ref 27–31)
MCHC RBC AUTO-ENTMCNC: 34.4 G/DL (ref 32–36)
MCHC RBC AUTO-ENTMCNC: 98.7 FL (ref 82–92)
METHADONE UR QL SCN: NEGATIVE
MONOCYTES # BLD AUTO: 0.41 10^3/UL (ref 0.1–0.8)
NEUTROPHILS # BLD AUTO: 3.88 10^3/UL (ref 2.5–7)
OXYCODONE UR QL SCN: NEGATIVE
PCP UR QL SCN: NEGATIVE
PLATELET # BLD AUTO: 368 10^3/UL (ref 150–400)
PMV BLD AUTO: 8.3 FL (ref 7.4–10.4)
POTASSIUM SERPL-SCNC: 3.5 MMOL/L (ref 3.5–5.1)
RBC # BLD AUTO: 3.86 10^6/UL (ref 4.5–6)
SODIUM SERPL-SCNC: 137 MMOL/L (ref 136–145)
TCA UR-MCNC: NEGATIVE UG/ML
THC UR QL SCN>50 NG/ML: NEGATIVE
WBC # BLD AUTO: 5.88 10^3/UL (ref 5–10)

## 2025-05-05 PROCEDURE — 85025 COMPLETE CBC W/AUTO DIFF WBC: CPT

## 2025-05-05 PROCEDURE — 80305 DRUG TEST PRSMV DIR OPT OBS: CPT

## 2025-05-05 PROCEDURE — 80048 BASIC METABOLIC PNL TOTAL CA: CPT

## 2025-05-05 PROCEDURE — 36415 COLL VENOUS BLD VENIPUNCTURE: CPT

## 2025-05-05 PROCEDURE — 99285 EMERGENCY DEPT VISIT HI MDM: CPT

## 2025-05-05 PROCEDURE — 96360 HYDRATION IV INFUSION INIT: CPT

## 2025-05-05 PROCEDURE — 80307 DRUG TEST PRSMV CHEM ANLYZR: CPT

## 2025-05-05 RX ADMIN — Medication PRN ML: at 22:39
